# Patient Record
Sex: FEMALE | Race: WHITE | Employment: STUDENT
[De-identification: names, ages, dates, MRNs, and addresses within clinical notes are randomized per-mention and may not be internally consistent; named-entity substitution may affect disease eponyms.]

---
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
## 2020-03-24 ENCOUNTER — ALLIED HEALTH/NURSE VISIT (OUTPATIENT)
Dept: FAMILY MEDICINE | Facility: OTHER | Age: 20
End: 2020-03-24
Payer: COMMERCIAL

## 2020-03-24 VITALS — DIASTOLIC BLOOD PRESSURE: 78 MMHG | BODY MASS INDEX: 30.53 KG/M2 | WEIGHT: 192 LBS | SYSTOLIC BLOOD PRESSURE: 138 MMHG

## 2020-03-24 DIAGNOSIS — Z30.42 ENCOUNTER FOR DEPO-PROVERA CONTRACEPTION: Primary | ICD-10-CM

## 2020-03-24 PROCEDURE — 96372 THER/PROPH/DIAG INJ SC/IM: CPT

## 2020-03-24 PROCEDURE — 25000128 H RX IP 250 OP 636: Performed by: NURSE PRACTITIONER

## 2020-03-24 RX ADMIN — MEDROXYPROGESTERONE ACETATE 150 MG: 150 INJECTION, SUSPENSION INTRAMUSCULAR at 12:43

## 2020-03-24 NOTE — PROGRESS NOTES
Clinic Administered Medication Documentation    Depo Provera Documentation    URINE HCG: not indicated    Depo-Provera Standing Order inclusion/exclusion criteria reviewed.   Patient meets: inclusion criteria     BP: 138/78  LAST PAP/EXAM: No results found for: PAP    Prior to injection, verified patient identity using patient's name and date of birth. Medication was administered. Please see MAR and medication order for additional information.     Was entire vial of medication used? Yes  Vial/Syringe: Single dose vial  Expiration Date:  11/2021    NEXT INJECTION DUE: 6/9/20 - 6/23/20    Appointment reminder card provided to patient.       Maral MUÑIZN, RN on 3/24/2020 at 12:43 PM

## 2020-09-29 ENCOUNTER — ALLIED HEALTH/NURSE VISIT (OUTPATIENT)
Dept: FAMILY MEDICINE | Facility: OTHER | Age: 20
End: 2020-09-29
Payer: COMMERCIAL

## 2020-09-29 DIAGNOSIS — R50.9 FEVER, UNSPECIFIED: Primary | ICD-10-CM

## 2020-09-29 DIAGNOSIS — R53.83 FATIGUE, UNSPECIFIED TYPE: ICD-10-CM

## 2020-09-29 PROCEDURE — U0003 INFECTIOUS AGENT DETECTION BY NUCLEIC ACID (DNA OR RNA); SEVERE ACUTE RESPIRATORY SYNDROME CORONAVIRUS 2 (SARS-COV-2) (CORONAVIRUS DISEASE [COVID-19]), AMPLIFIED PROBE TECHNIQUE, MAKING USE OF HIGH THROUGHPUT TECHNOLOGIES AS DESCRIBED BY CMS-2020-01-R: HCPCS | Mod: ZL

## 2020-09-29 PROCEDURE — 99207 ZZC NO CHARGE NURSE ONLY: CPT

## 2020-09-29 PROCEDURE — C9803 HOPD COVID-19 SPEC COLLECT: HCPCS

## 2020-09-30 LAB
SARS-COV-2 RNA SPEC QL NAA+PROBE: NOT DETECTED
SPECIMEN SOURCE: NORMAL

## 2020-11-09 ENCOUNTER — ALLIED HEALTH/NURSE VISIT (OUTPATIENT)
Dept: FAMILY MEDICINE | Facility: OTHER | Age: 20
End: 2020-11-09
Attending: FAMILY MEDICINE
Payer: COMMERCIAL

## 2020-11-09 DIAGNOSIS — J02.9 SORE THROAT: Primary | ICD-10-CM

## 2020-11-09 PROCEDURE — C9803 HOPD COVID-19 SPEC COLLECT: HCPCS

## 2020-11-09 PROCEDURE — U0003 INFECTIOUS AGENT DETECTION BY NUCLEIC ACID (DNA OR RNA); SEVERE ACUTE RESPIRATORY SYNDROME CORONAVIRUS 2 (SARS-COV-2) (CORONAVIRUS DISEASE [COVID-19]), AMPLIFIED PROBE TECHNIQUE, MAKING USE OF HIGH THROUGHPUT TECHNOLOGIES AS DESCRIBED BY CMS-2020-01-R: HCPCS | Mod: ZL | Performed by: FAMILY MEDICINE

## 2020-11-09 PROCEDURE — 99207 PR NO CHARGE NURSE ONLY: CPT

## 2020-11-09 NOTE — PROGRESS NOTES
Patient swabbed for COVID-19 testing.  For sore throat, headache, stuffy nose and nausea.  Day Gruber LPN on 11/9/2020 at 2:19 PM

## 2020-11-11 LAB
SARS-COV-2 RNA SPEC QL NAA+PROBE: NOT DETECTED
SPECIMEN SOURCE: NORMAL

## 2020-12-27 ENCOUNTER — HEALTH MAINTENANCE LETTER (OUTPATIENT)
Age: 20
End: 2020-12-27

## 2021-03-06 ENCOUNTER — HEALTH MAINTENANCE LETTER (OUTPATIENT)
Age: 21
End: 2021-03-06

## 2021-03-29 ENCOUNTER — OFFICE VISIT (OUTPATIENT)
Dept: FAMILY MEDICINE | Facility: OTHER | Age: 21
End: 2021-03-29
Attending: PHYSICIAN ASSISTANT
Payer: COMMERCIAL

## 2021-03-29 ENCOUNTER — E-VISIT (OUTPATIENT)
Dept: URGENT CARE | Facility: URGENT CARE | Age: 21
End: 2021-03-29
Payer: COMMERCIAL

## 2021-03-29 VITALS
RESPIRATION RATE: 16 BRPM | WEIGHT: 199.6 LBS | DIASTOLIC BLOOD PRESSURE: 76 MMHG | TEMPERATURE: 97.8 F | SYSTOLIC BLOOD PRESSURE: 114 MMHG | HEIGHT: 67 IN | OXYGEN SATURATION: 99 % | BODY MASS INDEX: 31.33 KG/M2 | HEART RATE: 74 BPM

## 2021-03-29 DIAGNOSIS — N89.8 VAGINAL DISCHARGE: Primary | ICD-10-CM

## 2021-03-29 DIAGNOSIS — N76.0 BACTERIAL VAGINOSIS: Primary | ICD-10-CM

## 2021-03-29 DIAGNOSIS — R35.0 URINARY FREQUENCY: ICD-10-CM

## 2021-03-29 DIAGNOSIS — N89.8 VAGINAL DISCHARGE: ICD-10-CM

## 2021-03-29 DIAGNOSIS — B96.89 BACTERIAL VAGINOSIS: Primary | ICD-10-CM

## 2021-03-29 LAB
SPECIMEN SOURCE: ABNORMAL
WET PREP SPEC: ABNORMAL

## 2021-03-29 PROCEDURE — 99214 OFFICE O/P EST MOD 30 MIN: CPT | Performed by: PHYSICIAN ASSISTANT

## 2021-03-29 PROCEDURE — 87210 SMEAR WET MOUNT SALINE/INK: CPT | Mod: ZL | Performed by: PHYSICIAN ASSISTANT

## 2021-03-29 PROCEDURE — 99421 OL DIG E/M SVC 5-10 MIN: CPT | Performed by: PHYSICIAN ASSISTANT

## 2021-03-29 RX ORDER — METRONIDAZOLE 500 MG/1
500 TABLET ORAL 2 TIMES DAILY
Qty: 14 TABLET | Refills: 0 | Status: SHIPPED | OUTPATIENT
Start: 2021-03-29 | End: 2021-04-05

## 2021-03-29 ASSESSMENT — MIFFLIN-ST. JEOR: SCORE: 1700.07

## 2021-03-29 ASSESSMENT — PAIN SCALES - GENERAL: PAINLEVEL: NO PAIN (0)

## 2021-03-29 NOTE — PATIENT INSTRUCTIONS
Dear Diandra Browne,    We are sorry you are not feeling well. Based on the responses you provided, it is recommended that you be seen in-person in urgent care so we can better evaluate your symptoms. Please click here to find the nearest urgent care location to you.     You will need urine testing as well as a test on the vaginal discharge to determine what the underlying cause of your symptoms is.      Thank you for trusting us with your care.    Niurka Conklin PA-C

## 2021-03-29 NOTE — NURSING NOTE
Patient presents today for urinary issue. Patient complains of urinary urgency, discharge and a foul odor.    Medication Reconciliation Complete    Zaida Banks LPN  3/29/2021 1:42 PM

## 2021-03-29 NOTE — PROGRESS NOTES
Assessment & Plan     1. Vaginal discharge  Positive for BV. Declined STD testing.   - Wet Prep, Genital    2. Bacterial vaginosis  Wet prep positive for BV. Will treat with Flagyl as below. Avoid alcohol while on this medication. Follow up as needed.   - metroNIDAZOLE (FLAGYL) 500 MG tablet; Take 1 tablet (500 mg) by mouth 2 times daily for 7 days  Dispense: 14 tablet; Refill: 0      Return if symptoms worsen or fail to improve.    Birgit Sellers PA-C  [facility] AND \[facility] [place] [facility]\""   Diandra is a 20 year old who presents for the following health issues    HPI   Here for evaluation of vaginal concerns. Notes 4 days ago she developed an increase in vaginal discharge that is white and smells different compared to her baseline. Mild associated vaginal itching as well. Some urinary frequency. No STD or pregnancy concerns. No associated fever/chils, dysuria, hematuria, flank pain, abdominal pain, nausea, vomiting, diarrhea, constipation.    PAST MEDICAL HISTORY:   Past Medical History:   Diagnosis Date     Acute appendicitis 9/24/2016     Allergy status to unspecified drugs, medicaments and biological substances status     No Comments Provided     Mild intermittent asthma, uncomplicated     9/29/2009     Other ehrlichiosis     6/19/16     Personal history of other medical treatment (CODE)     No Comments Provided       PAST SURGICAL HISTORY:   Past Surgical History:   Procedure Laterality Date     APPENDECTOMY  2016       FAMILY HISTORY:   Family History   Problem Relation Age of Onset     Family History Negative Mother         Good Health     Family History Negative Father         Good Health     Asthma Father         Asthma     Allergy (Severe) Father         Allergies,Seasonal     Diabetes Maternal Grandfather         Diabetes     Hypertension Maternal Grandfather         Hypertension     Hyperlipidemia Maternal Grandfather         Hyperlipidemia     Cancer Paternal Grandmother          "Cancer,pancreatic, lung, liver     Other - See Comments Paternal Grandmother         migraine HA     Allergy (Severe) Paternal Grandmother         Allergies     Asthma Paternal Grandmother         Asthma       SOCIAL HISTORY:   Social History     Tobacco Use     Smoking status: Passive Smoke Exposure - Never Smoker     Smokeless tobacco: Never Used     Tobacco comment: parents both smoke   Substance Use Topics     Alcohol use: No     Alcohol/week: 0.0 standard drinks        Allergies   Allergen Reactions     Penicillins Hives     Amoxicillin Hives and Rash     Current Outpatient Medications   Medication     albuterol (PROVENTIL) (2.5 MG/3ML) 0.083% neb solution     albuterol (VENTOLIN HFA) 108 (90 Base) MCG/ACT inhaler     Spacer/Aero-Holding Chambers (SPACER/AERO-HOLD CHAMBER MASK) EFREM     No current facility-administered medications for this visit.          Review of Systems   Per HPI.         Objective    /76   Pulse 74   Temp 97.8  F (36.6  C)   Resp 16   Ht 1.689 m (5' 6.5\")   Wt 90.5 kg (199 lb 9.6 oz)   LMP 03/16/2021 (Approximate)   SpO2 99%   BMI 31.73 kg/m    Body mass index is 31.73 kg/m .  Physical Exam   General: Pleasant, in no apparent distress.  Genitourinary Exam Female: External genitalia, vulva and vagina are without lesions, masses, or ulcerations. Mild amount of white discharge. Wet prep obtained.   Psych: Appropriate mood and affect.            "

## 2021-06-03 DIAGNOSIS — J45.20 MILD INTERMITTENT ASTHMA WITHOUT COMPLICATION: ICD-10-CM

## 2021-06-04 RX ORDER — ALBUTEROL SULFATE 90 UG/1
AEROSOL, METERED RESPIRATORY (INHALATION)
Qty: 18 G | Refills: 1 | Status: SHIPPED | OUTPATIENT
Start: 2021-06-04 | End: 2022-08-19

## 2021-06-04 NOTE — TELEPHONE ENCOUNTER
" [facility]acy [id number] sent Rx request for the following:      Requested Prescriptions   Pending Prescriptions Disp Refills     albuterol (VENTOLIN HFA) 108 (90 Base) MCG/ACT inhaler 18 g 0     Sig: INHALE 2 PUFFS INTO THE LUNGS EVERY 4 HOURS AS NEEDED       Asthma Maintenance Inhalers - Anticholinergics Failed - 6/3/2021  8:00 AM        Failed - Asthma control assessment score within normal limits in last 6 months     Please review ACT score.           Failed - Recent (6 mo) or future (30 days) visit within the authorizing provider's specialty     Patient had office visit in the last 6 months or has a visit in the next 30 days with authorizing provider or within the authorizing provider's specialty.  See \"Patient Info\" tab in inbasket, or \"Choose Columns\" in Meds & Orders section of the refill encounter.           Short-Acting Beta Agonist Inhalers Protocol  Failed - 6/3/2021  8:00 AM        Failed - Asthma control assessment score within normal limits in last 6 months     Please review ACT score.           Failed - Recent (6 mo) or future (30 days) visit within the authorizing provider's specialty     Patient had office visit in the last 6 months or has a visit in the next 30 days with authorizing provider or within the authorizing provider's specialty.  See \"Patient Info\" tab in inbasket, or \"Choose Columns\" in Meds & Orders section of the refill encounter.              Last Prescription Date:    12/30/19   Last Fill Qty/Refills:        1 inhaler , R-3  Last Office Visit:              3/29/21  Future Office visit:          None   Routing refill request to provider for review/approval because:    Does not meet RN refill criteria. Will route to last provider she has seen for review and consideration. Danni Gillis RN on 6/4/2021 at 1:00 PM    "

## 2022-08-19 DIAGNOSIS — J45.20 MILD INTERMITTENT ASTHMA WITHOUT COMPLICATION: ICD-10-CM

## 2022-08-19 RX ORDER — ALBUTEROL SULFATE 90 UG/1
AEROSOL, METERED RESPIRATORY (INHALATION)
Qty: 54 G | Refills: 0 | Status: SHIPPED | OUTPATIENT
Start: 2022-08-19

## 2022-08-19 NOTE — TELEPHONE ENCOUNTER
[id number] sent Rx request for the following:      Requested Prescriptions   Pending Prescriptions Disp Refills     albuterol (VENTOLIN HFA) 108 (90 Base) MCG/ACT inhaler [Pharmacy Med Name: VENTOLIN HFA 90MCG/ACT INHALER]  1     Sig: INHALE 2 PUFFS INTO THE LUNGS EVERY 4 HOURS AS NEEDED       Asthma Maintenance Inhalers - Anticholinergics Failed - 8/19/2022  3:17 PM    Short-Acting Beta Agonist Inhalers Protocol  Failed - 8/19/2022  3:17 PM        Failed - Asthma control assessment score within normal limits in last 6 months     Please review ACT score.         Failed - Recent (6 mo) or future (30 days) visit within the authorizing provider's specialty     Last Prescription Date:   6/4/21  Last Fill Qty/Refills:         18 g, R-1    Last Office Visit:              3/29/21   Future Office visit:           None    In clinical absence of Birgit Sellers patient is requesting that this message be sent to the covering provider for consideration please.    Sarai Tamayo RN .............. 8/19/2022  3:18 PM

## 2022-08-30 ENCOUNTER — HOSPITAL ENCOUNTER (EMERGENCY)
Facility: HOSPITAL | Age: 22
Discharge: HOME | End: 2022-08-30
Attending: PHYSICIAN ASSISTANT | Admitting: PHYSICIAN ASSISTANT
Payer: COMMERCIAL

## 2022-08-30 VITALS
DIASTOLIC BLOOD PRESSURE: 83 MMHG | TEMPERATURE: 96.7 F | SYSTOLIC BLOOD PRESSURE: 135 MMHG | RESPIRATION RATE: 16 BRPM | OXYGEN SATURATION: 96 % | HEART RATE: 84 BPM

## 2022-08-30 DIAGNOSIS — K08.89 PAIN, DENTAL: ICD-10-CM

## 2022-08-30 PROCEDURE — G0463 HOSPITAL OUTPT CLINIC VISIT: HCPCS

## 2022-08-30 PROCEDURE — 99213 OFFICE O/P EST LOW 20 MIN: CPT | Performed by: PHYSICIAN ASSISTANT

## 2022-08-30 RX ORDER — CLINDAMYCIN HCL 300 MG
300 CAPSULE ORAL 3 TIMES DAILY
Qty: 21 CAPSULE | Refills: 0 | Status: SHIPPED | OUTPATIENT
Start: 2022-08-30 | End: 2022-09-06

## 2022-08-30 ASSESSMENT — ENCOUNTER SYMPTOMS: FEVER: 0

## 2022-08-31 NOTE — ED TRIAGE NOTES
Pt presents with tooth pain left lower side. Pt rates current pain 8/10. Denies fever. Pt does not have a dentist.

## 2022-08-31 NOTE — ED PROVIDER NOTES
History     Chief Complaint   Patient presents with     Dental Pain     HPI  Diandra Browne is a 21 year old female who presents to urgent care for evaluation of dental pain.  Patient states that she has had a lower left molar that has a crack out of it for approximately 4 years and over past couple days for no apparent reason it has become painful around it.  She denies any fevers, chills, bad taste in her mouth, or any other associated symptoms.  Patient states that she tried to contact her local dentist today but was unable to get an appointment.  Patient states she has been performing warm salt water rinses.    Allergies:  Allergies   Allergen Reactions     Penicillins Hives     Amoxicillin Hives and Rash       Problem List:    Patient Active Problem List    Diagnosis Date Noted     Mild intermittent asthma 09/29/2009     Priority: Medium        Past Medical History:    Past Medical History:   Diagnosis Date     Acute appendicitis 9/24/2016     Allergy status to unspecified drugs, medicaments and biological substances status      Mild intermittent asthma, uncomplicated      Other ehrlichiosis      Personal history of other medical treatment (CODE)        Past Surgical History:    Past Surgical History:   Procedure Laterality Date     APPENDECTOMY  2016       Family History:    Family History   Problem Relation Age of Onset     Family History Negative Mother         Good Health     Family History Negative Father         Good Health     Asthma Father         Asthma     Allergy (Severe) Father         Allergies,Seasonal     Diabetes Maternal Grandfather         Diabetes     Hypertension Maternal Grandfather         Hypertension     Hyperlipidemia Maternal Grandfather         Hyperlipidemia     Cancer Paternal Grandmother         Cancer,pancreatic, lung, liver     Other - See Comments Paternal Grandmother         migraine HA     Allergy (Severe) Paternal Grandmother         Allergies     Asthma Paternal Grandmother          Asthma       Social History:  Marital Status:  Single [1]  Social History     Tobacco Use     Smoking status: Passive Smoke Exposure - Never Smoker     Smokeless tobacco: Never Used     Tobacco comment: parents both smoke   Substance Use Topics     Alcohol use: No     Alcohol/week: 0.0 standard drinks     Drug use: No        Medications:    clindamycin (CLEOCIN) 300 MG capsule  albuterol (PROVENTIL) (2.5 MG/3ML) 0.083% neb solution  albuterol (VENTOLIN HFA) 108 (90 Base) MCG/ACT inhaler  Spacer/Aero-Holding Chambers (SPACER/AERO-HOLD CHAMBER MASK) EFREM          Review of Systems   Constitutional: Negative for fever.   HENT: Positive for dental problem.    All other systems reviewed and are negative.      Physical Exam   BP: 135/83  Pulse: 84  Temp: (!) 96.7  F (35.9  C)  Resp: 16  SpO2: 96 %      Physical Exam  Vitals and nursing note reviewed.   Constitutional:       General: She is not in acute distress.     Appearance: Normal appearance. She is not ill-appearing.   HENT:      Mouth/Throat:        Comments: Tooth #18 has a crack over the medial anterior portion of the tooth.  No obvious dental abscess.  Cardiovascular:      Rate and Rhythm: Regular rhythm.      Heart sounds: Normal heart sounds.   Pulmonary:      Effort: Pulmonary effort is normal.      Breath sounds: Normal breath sounds.   Neurological:      Mental Status: She is oriented to person, place, and time.         ED Course                 Procedures             Critical Care time:               No results found for this or any previous visit (from the past 24 hour(s)).    Medications - No data to display    Assessments & Plan (with Medical Decision Making)   #1.  Dental pain    Discussed exam findings with patient.  Patient is prescribed course of clindamycin due to penicillin allergy.  She was given a list of local dental resources and should follow-up when available.  Tylenol or ibuprofen as directed for pain.  She is instructed on multiple  supportive cares for her dental pain as well.  Any additional concerns she can return to urgent care or follow-up with primary care provider.  Patient verbalized understanding and agreement of plan.    I have reviewed the nursing notes.    I have reviewed the findings, diagnosis, plan and need for follow up with the patient.    New Prescriptions    CLINDAMYCIN (CLEOCIN) 300 MG CAPSULE    Take 1 capsule (300 mg) by mouth 3 times daily for 7 days       Final diagnoses:   Pain, dental       8/30/2022   [facility]     Matthew Mcguire PA-C  08/30/22 [id number]

## 2022-08-31 NOTE — ED TRIAGE NOTES
"Pt presents with tooth pain onset a \"few days\"  Pt states she tried to get into a dentist today, but was unable.       "

## 2022-11-20 ENCOUNTER — HOSPITAL ENCOUNTER (EMERGENCY)
Facility: OTHER | Age: 22
Discharge: HOME | End: 2022-11-20
Attending: EMERGENCY MEDICINE | Admitting: EMERGENCY MEDICINE
Payer: COMMERCIAL

## 2022-11-20 ENCOUNTER — APPOINTMENT (OUTPATIENT)
Dept: GENERAL RADIOLOGY | Facility: OTHER | Age: 22
End: 2022-11-20
Attending: EMERGENCY MEDICINE
Payer: COMMERCIAL

## 2022-11-20 VITALS
DIASTOLIC BLOOD PRESSURE: 78 MMHG | BODY MASS INDEX: 31.64 KG/M2 | TEMPERATURE: 98.2 F | RESPIRATION RATE: 18 BRPM | SYSTOLIC BLOOD PRESSURE: 136 MMHG | HEART RATE: 71 BPM | OXYGEN SATURATION: 97 % | WEIGHT: 199 LBS

## 2022-11-20 DIAGNOSIS — S63.697A OTHER SPRAIN OF LEFT LITTLE FINGER, INITIAL ENCOUNTER: ICD-10-CM

## 2022-11-20 PROCEDURE — 73140 X-RAY EXAM OF FINGER(S): CPT | Mod: LT

## 2022-11-20 PROCEDURE — 99282 EMERGENCY DEPT VISIT SF MDM: CPT | Performed by: EMERGENCY MEDICINE

## 2022-11-20 PROCEDURE — 99283 EMERGENCY DEPT VISIT LOW MDM: CPT | Performed by: EMERGENCY MEDICINE

## 2022-11-20 ASSESSMENT — ENCOUNTER SYMPTOMS
NAUSEA: 0
AGITATION: 0
ARTHRALGIAS: 1
CHILLS: 0
SHORTNESS OF BREATH: 0
FEVER: 0
VOMITING: 0
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
## 2023-05-30 ENCOUNTER — HOSPITAL ENCOUNTER (EMERGENCY)
Facility: OTHER | Age: 23
Discharge: HOME | End: 2023-05-30
Attending: STUDENT IN AN ORGANIZED HEALTH CARE EDUCATION/TRAINING PROGRAM | Admitting: STUDENT IN AN ORGANIZED HEALTH CARE EDUCATION/TRAINING PROGRAM
Payer: COMMERCIAL

## 2023-05-30 ENCOUNTER — APPOINTMENT (OUTPATIENT)
Dept: GENERAL RADIOLOGY | Facility: OTHER | Age: 23
End: 2023-05-30
Attending: STUDENT IN AN ORGANIZED HEALTH CARE EDUCATION/TRAINING PROGRAM
Payer: COMMERCIAL

## 2023-05-30 VITALS
DIASTOLIC BLOOD PRESSURE: 85 MMHG | SYSTOLIC BLOOD PRESSURE: 133 MMHG | HEART RATE: 94 BPM | TEMPERATURE: 98.3 F | HEIGHT: 66 IN | OXYGEN SATURATION: 98 % | WEIGHT: 199 LBS | BODY MASS INDEX: 31.98 KG/M2 | RESPIRATION RATE: 16 BRPM

## 2023-05-30 DIAGNOSIS — J45.901 MILD ASTHMA WITH EXACERBATION, UNSPECIFIED WHETHER PERSISTENT: ICD-10-CM

## 2023-05-30 PROCEDURE — 999N000157 HC STATISTIC RCP TIME EA 10 MIN

## 2023-05-30 PROCEDURE — 71046 X-RAY EXAM CHEST 2 VIEWS: CPT

## 2023-05-30 PROCEDURE — 94640 AIRWAY INHALATION TREATMENT: CPT

## 2023-05-30 PROCEDURE — 250N000009 HC RX 250: Performed by: STUDENT IN AN ORGANIZED HEALTH CARE EDUCATION/TRAINING PROGRAM

## 2023-05-30 PROCEDURE — 99284 EMERGENCY DEPT VISIT MOD MDM: CPT | Performed by: STUDENT IN AN ORGANIZED HEALTH CARE EDUCATION/TRAINING PROGRAM

## 2023-05-30 PROCEDURE — 250N000012 HC RX MED GY IP 250 OP 636 PS 637: Performed by: STUDENT IN AN ORGANIZED HEALTH CARE EDUCATION/TRAINING PROGRAM

## 2023-05-30 PROCEDURE — 99283 EMERGENCY DEPT VISIT LOW MDM: CPT | Mod: 25 | Performed by: STUDENT IN AN ORGANIZED HEALTH CARE EDUCATION/TRAINING PROGRAM

## 2023-05-30 RX ORDER — PREDNISONE 20 MG/1
TABLET ORAL
Qty: 6 TABLET | Refills: 0 | Status: SHIPPED | OUTPATIENT
Start: 2023-05-30 | End: 2023-05-30

## 2023-05-30 RX ORDER — PREDNISONE 20 MG/1
40 TABLET ORAL ONCE
Status: COMPLETED | OUTPATIENT
Start: 2023-05-30 | End: 2023-05-30

## 2023-05-30 RX ORDER — ALBUTEROL SULFATE 0.83 MG/ML
2.5 SOLUTION RESPIRATORY (INHALATION) ONCE
Status: COMPLETED | OUTPATIENT
Start: 2023-05-30 | End: 2023-05-30

## 2023-05-30 RX ORDER — PREDNISONE 20 MG/1
TABLET ORAL
Qty: 4 TABLET | Refills: 0 | Status: SHIPPED | OUTPATIENT
Start: 2023-05-30

## 2023-05-30 RX ADMIN — PREDNISONE 40 MG: 20 TABLET ORAL at 20:18

## 2023-05-30 RX ADMIN — ALBUTEROL SULFATE 2.5 MG: 2.5 SOLUTION RESPIRATORY (INHALATION) at 19:42

## 2023-05-30 NOTE — ED TRIAGE NOTES
Pt here by herself with c/o chest tightness, SOB and cough, pt reports a hx of asthma and has been using her inhaler without relief, VSS, pt out into waiting room     Triage Assessment     Row Name 05/30/23 8577       Triage Assessment (Adult)    Airway WDL WDL       Respiratory WDL    Respiratory WDL WDL       Skin Circulation/Temperature WDL    Skin Circulation/Temperature WDL WDL       Cardiac WDL    Cardiac WDL WDL       Peripheral/Neurovascular WDL    Peripheral Neurovascular WDL WDL       Cognitive/Neuro/Behavioral WDL    Cognitive/Neuro/Behavioral WDL WDL

## 2023-05-31 NOTE — DISCHARGE INSTRUCTIONS
You were treated with albuterol nebulizer and improved.  Suspect this is asthma related.  Continue to use your albuterol inhaler as needed.  Complete 2-day course of steroids with first dose tomorrow. We gave you 1 dose of this here today. This will help with inflammation in your lungs.    Please review attached instructions including reasons to return to the emergency department.

## 2023-05-31 NOTE — ED PROVIDER NOTES
History     Chief Complaint   Patient presents with     Shortness of Breath       Diandra Browne is a 22 year old female who presents with dyspnea.  Gradual onset starting several days ago.  Endorses associated substernal chest tightness and dry cough.  Home albuterol inhaler not helpful.  History includes asthma.  Denies any allergies or recent new exposures to topical agents, animal dander or other new environmental exposures.  Denies fever, chills, productive cough.  No recent illness.  History does include asthma exacerbations, most recently about 1 month ago.    Allergies   Allergen Reactions     Penicillins Hives     Amoxicillin Hives and Rash       Patient Active Problem List    Diagnosis Date Noted     Mild intermittent asthma 09/29/2009     Priority: Medium       Past Medical History:   Diagnosis Date     Acute appendicitis 9/24/2016     Allergy status to unspecified drugs, medicaments and biological substances status      Mild intermittent asthma, uncomplicated      Other ehrlichiosis      Personal history of other medical treatment (CODE)        Past Surgical History:   Procedure Laterality Date     APPENDECTOMY  2016       Family History   Problem Relation Age of Onset     Family History Negative Mother         Good Health     Family History Negative Father         Good Health     Asthma Father         Asthma     Allergy (Severe) Father         Allergies,Seasonal     Diabetes Maternal Grandfather         Diabetes     Hypertension Maternal Grandfather         Hypertension     Hyperlipidemia Maternal Grandfather         Hyperlipidemia     Cancer Paternal Grandmother         Cancer,pancreatic, lung, liver     Other - See Comments Paternal Grandmother         migraine HA     Allergy (Severe) Paternal Grandmother         Allergies     Asthma Paternal Grandmother         Asthma       Social History     Tobacco Use     Smoking status: Passive Smoke Exposure - Never Smoker     Smokeless tobacco: Never      "Tobacco comments:     parents both smoke   Substance Use Topics     Alcohol use: No     Alcohol/week: 0.0 standard drinks of alcohol     Drug use: No       Medications:    albuterol (PROVENTIL) (2.5 MG/3ML) 0.083% neb solution  predniSONE (DELTASONE) 20 MG tablet  albuterol (VENTOLIN HFA) 108 (90 Base) MCG/ACT inhaler  Spacer/Aero-Holding Chambers (SPACER/AERO-HOLD CHAMBER MASK) EFREM        Review of Systems: See HPI for pertinent negatives and positives. All other systems reviewed and found to be negative.    Physical Exam   /85   Pulse 94   Temp 98.3  F (36.8  C) (Tympanic)   Resp 16   Ht 1.676 m (5' 6\")   Wt 90.3 kg (199 lb)   SpO2 98%   BMI 32.12 kg/m       General: awake, comfortable  HEENT: atraumatic  Respiratory: normal effort, clear to auscultation bilaterally, wheeze with forced expiration  Cardiovascular: regular rate and rhythm, no murmurs  Abdomen: soft, nondistended, nontender  Extremities: no deformities, edema, or tenderness  Skin: warm, dry, no rashes  Neuro: alert, no focal deficits  Psych: appropriate mood and affect    ED Course           Results for orders placed or performed during the hospital encounter of 05/30/23 (from the past 24 hour(s))   XR Chest 2 Views    Narrative    PROCEDURE:  XR CHEST 2 VIEWS    HISTORY:  chest tightness, cough, sob.     COMPARISON:  None.    FINDINGS:   The cardiac silhouette is normal in size. The pulmonary vasculature is  normal.  The lungs are clear. No pleural effusion or pneumothorax.      Impression    IMPRESSION:  No acute cardiopulmonary disease.      TERESA MANJARREZ MD         SYSTEM ID:  [id number]       Medications   albuterol (PROVENTIL) neb solution 2.5 mg (2.5 mg Nebulization $Given 5/30/23 1942)   predniSONE (DELTASONE) tablet 40 mg (40 mg Oral $Given 5/30/23 2018)       Assessments & Plan (with Medical Decision Making)     I have reviewed the nursing notes.    22 year old female evaluated for shortness of breath and dry cough for the " past several days.  History does include asthma.  Normal vitals on room air.  Exam with wheeze only present with forced expiration.  Treated with albuterol nebulizer with patient feeling much improved.  She has plenty of medication remaining in her albuterol inhaler.  Due to no improvement at home with her albuterol inhaler, and prescribing a short course of prednisone with first dose given here in ED.  Discharged home with attached instructions on diagnosis provided including ED return precautions.     I have reviewed the findings, diagnosis, plan, and need for any follow up with the patient.    Patient instructions:   You were treated with albuterol nebulizer and improved.  Suspect this is asthma related.  Continue to use your albuterol inhaler as needed.  Complete 2-day course of steroids with first dose tomorrow. We gave you 1 dose of this here today. This will help with inflammation in your lungs.    Please review attached instructions including reasons to return to the emergency department.     Discharge Medication List as of 5/30/2023  8:17 PM          Final diagnoses:   Mild asthma with exacerbation, unspecified whether persistent       5/30/2023   [facility] AND [facility]     Van June MD  05/30/23 [id number]

## 2023-07-29 ENCOUNTER — HEALTH MAINTENANCE LETTER (OUTPATIENT)
Age: 23
End: 2023-07-29
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
## 2024-09-21 ENCOUNTER — HEALTH MAINTENANCE LETTER (OUTPATIENT)
Age: 24
End: 2024-09-21

## 2025-05-19 ENCOUNTER — HOSPITAL ENCOUNTER (EMERGENCY)
Facility: OTHER | Age: 25
Discharge: HOME | End: 2025-05-19
Attending: EMERGENCY MEDICINE
Payer: COMMERCIAL

## 2025-05-19 VITALS
HEART RATE: 92 BPM | RESPIRATION RATE: 20 BRPM | DIASTOLIC BLOOD PRESSURE: 97 MMHG | OXYGEN SATURATION: 100 % | TEMPERATURE: 98.8 F | SYSTOLIC BLOOD PRESSURE: 134 MMHG

## 2025-05-19 DIAGNOSIS — R07.1 CHEST PAIN ON BREATHING: ICD-10-CM

## 2025-05-19 DIAGNOSIS — R06.4 HYPERVENTILATION: ICD-10-CM

## 2025-05-19 DIAGNOSIS — R06.02 SHORTNESS OF BREATH: ICD-10-CM

## 2025-05-19 PROCEDURE — 99283 EMERGENCY DEPT VISIT LOW MDM: CPT | Mod: 25 | Performed by: EMERGENCY MEDICINE

## 2025-05-19 PROCEDURE — 99284 EMERGENCY DEPT VISIT MOD MDM: CPT | Performed by: EMERGENCY MEDICINE

## 2025-05-19 PROCEDURE — 94640 AIRWAY INHALATION TREATMENT: CPT

## 2025-05-19 PROCEDURE — 250N000009 HC RX 250: Performed by: EMERGENCY MEDICINE

## 2025-05-19 PROCEDURE — 999N000157 HC STATISTIC RCP TIME EA 10 MIN

## 2025-05-19 PROCEDURE — 93010 ELECTROCARDIOGRAM REPORT: CPT | Performed by: INTERNAL MEDICINE

## 2025-05-19 RX ORDER — IPRATROPIUM BROMIDE AND ALBUTEROL SULFATE 2.5; .5 MG/3ML; MG/3ML
3 SOLUTION RESPIRATORY (INHALATION) ONCE
Status: COMPLETED | OUTPATIENT
Start: 2025-05-19 | End: 2025-05-19

## 2025-05-19 RX ADMIN — IPRATROPIUM BROMIDE AND ALBUTEROL SULFATE 3 ML: .5; 3 SOLUTION RESPIRATORY (INHALATION) at 23:19

## 2025-05-19 ASSESSMENT — ENCOUNTER SYMPTOMS
NEUROLOGICAL NEGATIVE: 1
SHORTNESS OF BREATH: 1
FEVER: 0
HEMATOLOGIC/LYMPHATIC NEGATIVE: 1
STRIDOR: 0
COUGH: 0
FATIGUE: 0
PSYCHIATRIC NEGATIVE: 1
CHILLS: 0
CHEST TIGHTNESS: 1
MUSCULOSKELETAL NEGATIVE: 1
WHEEZING: 0
PALPITATIONS: 0
CHOKING: 0
GASTROINTESTINAL NEGATIVE: 1

## 2025-05-19 ASSESSMENT — ACTIVITIES OF DAILY LIVING (ADL): ADLS_ACUITY_SCORE: 42

## 2025-05-20 LAB
ATRIAL RATE - MUSE: 80 BPM
DIASTOLIC BLOOD PRESSURE - MUSE: NORMAL MMHG
INTERPRETATION ECG - MUSE: NORMAL
P AXIS - MUSE: 55 DEGREES
PR INTERVAL - MUSE: 126 MS
QRS DURATION - MUSE: 84 MS
QT - MUSE: 370 MS
QTC - MUSE: 426 MS
R AXIS - MUSE: 59 DEGREES
SYSTOLIC BLOOD PRESSURE - MUSE: NORMAL MMHG
T AXIS - MUSE: 35 DEGREES
VENTRICULAR RATE- MUSE: 80 BPM

## 2025-05-20 NOTE — ED PROVIDER NOTES
History     Chief Complaint   Patient presents with    Chest Pain    Asthma Exacerbation     HPI  24 year old female with ho asthma.  She presents with CP and SOB that has been going on for 2-3 months.  Took her albuterol inhaler and felt it was not helping. Tonight was getting ready for work and started feeling SOB. No palpitations. Felt the pain as sharp wo radiation and arrived mild hyperventilating.   She did drive into ED on her own. No fevers. No recent sick contacts. No cough or fever.   Has used prednisone in the past. Not in over 6 months. No sweating. No ho CAD or PE. No tobacco or illicit drugs.   Patient seen in room #8.    Allergies:  Allergies   Allergen Reactions    Penicillins Hives    Amoxicillin Hives and Rash       Problem List:    Patient Active Problem List    Diagnosis Date Noted    Mild intermittent asthma 09/29/2009     Priority: Medium        Past Medical History:    Past Medical History:   Diagnosis Date    Acute appendicitis 9/24/2016    Allergy status to unspecified drugs, medicaments and biological substances status     Mild intermittent asthma, uncomplicated     Other ehrlichiosis     Personal history of other medical treatment (CODE)        Past Surgical History:    Past Surgical History:   Procedure Laterality Date    APPENDECTOMY  2016       Family History:    Family History   Problem Relation Age of Onset    Family History Negative Mother         Good Health    Family History Negative Father         Good Health    Asthma Father         Asthma    Allergy (Severe) Father         Allergies,Seasonal    Diabetes Maternal Grandfather         Diabetes    Hypertension Maternal Grandfather         Hypertension    Hyperlipidemia Maternal Grandfather         Hyperlipidemia    Cancer Paternal Grandmother         Cancer,pancreatic, lung, liver    Other - See Comments Paternal Grandmother         migraine HA    Allergy (Severe) Paternal Grandmother         Allergies    Asthma Paternal  Grandmother         Asthma       Social History:  Marital Status:  Single [1]  Social History     Tobacco Use    Smoking status: Passive Smoke Exposure - Never Smoker    Smokeless tobacco: Never    Tobacco comments:     parents both smoke   Substance Use Topics    Alcohol use: No     Alcohol/week: 0.0 standard drinks of alcohol    Drug use: No        Medications:    albuterol (PROVENTIL) (2.5 MG/3ML) 0.083% neb solution  albuterol (VENTOLIN HFA) 108 (90 Base) MCG/ACT inhaler  predniSONE (DELTASONE) 20 MG tablet  Spacer/Aero-Holding Chambers (SPACER/AERO-HOLD CHAMBER MASK) EFREM          Review of Systems   Constitutional:  Negative for chills, fatigue and fever.   HENT: Negative.  Negative for congestion.    Respiratory:  Positive for chest tightness and shortness of breath. Negative for cough, choking, wheezing and stridor.    Cardiovascular:  Positive for chest pain. Negative for palpitations and leg swelling.   Gastrointestinal: Negative.    Musculoskeletal: Negative.    Skin: Negative.    Neurological: Negative.    Hematological: Negative.    Psychiatric/Behavioral: Negative.         Physical Exam   BP: (!) 134/97  Pulse: 92  SpO2: 100 %      Physical Exam  Vitals and nursing note reviewed.   Constitutional:       General: She is not in acute distress.     Appearance: Normal appearance. She is well-developed. She is not ill-appearing or diaphoretic.      Comments: Mild hyperventilating, cooperative.    HENT:      Head: Atraumatic.      Mouth/Throat:      Mouth: Mucous membranes are moist.   Eyes:      General: No scleral icterus.     Conjunctiva/sclera: Conjunctivae normal.      Pupils: Pupils are equal, round, and reactive to light.   Cardiovascular:      Rate and Rhythm: Normal rate and regular rhythm.      Heart sounds: Normal heart sounds.   Pulmonary:      Effort: Pulmonary effort is normal. No tachypnea, accessory muscle usage or respiratory distress.      Breath sounds: Normal breath sounds. No stridor. No  wheezing or rales.   Abdominal:      General: Abdomen is flat.      Palpations: Abdomen is soft.   Musculoskeletal:         General: Normal range of motion.      Cervical back: Normal range of motion and neck supple.   Skin:     General: Skin is warm.      Findings: No rash.   Neurological:      Mental Status: She is alert and oriented to person, place, and time.   Psychiatric:         Mood and Affect: Mood is anxious.         ED Course     ED Course as of 05/19/25 2342   Mon May 19, 2025   2306 EKG independently reviewed by myself shows normal sinus rhythm at 80 without any ST or T wave changes     Procedures         No results found for this or any previous visit (from the past 24 hours).    Medications   ipratropium - albuterol 0.5 mg/2.5 mg/3 mL (DUONEB) neb solution 3 mL (3 mLs Nebulization $Given 5/19/25 4693)       Assessments & Plan (with Medical Decision Making)     I have reviewed the nursing notes.    I have reviewed the findings, diagnosis, plan and need for follow up with the patient.  25 yo female with ho asthma presents with 2 months of nondescript chest pain and SOB.   She did have similar presentation in 2021 and 2023 on chart review that appeared cw asthma exacerbation. She did take prednisone at that time.  Duoneb helped her breathing but did little for her chest pain. She feels there is something more going on than her asthma or any suggestion the it might be anxiety. Vitals are stable, sats were 99% RA. She was not tachycardic.  Likelihood of PE, CAD etiology is less likely. GERD and other GI related issues is less likely as well. However, when offered further assessment including labs, CXR, she felt we were not listening to her and taking her symptoms seriously. She then choose to leave. She was encouraged to return at any time her symptoms were worsening. We did discuss possible trial of Toradol and ativan, but she did not have a ride home. She remained quite tearful and upset prior to  discharge. She did appear to understand discharge instructions and return precautions.    New Prescriptions    No medications on file       Final diagnoses:   Shortness of breath   Chest pain on breathing   Hyperventilation       5/19/2025   [facility] DO  05/20/25 1824

## 2025-05-20 NOTE — ED TRIAGE NOTES
Pt here for chest pain x2 months and an asthma exacerbation.  Pt states she took her rescue inhaler at approx. 2200 and this did not help.  Pain in pts' chest is midsternal over to the left and radiates to her shoulder blades.  Pt has not been seen for this in the past 2 months.

## 2025-05-20 NOTE — DISCHARGE INSTRUCTIONS
Drink plenty of fluids.  Continue with your albuterol as needed.  Consider ibuprofen 400-600mg every 6-8 hours as needed.  Please follow up for further testing when you feel comfortable.

## (undated) RX ORDER — MEDROXYPROGESTERONE ACETATE 150 MG/ML
INJECTION, SUSPENSION INTRAMUSCULAR
Status: DISPENSED
Start: 2018-06-06

## (undated) RX ORDER — MEDROXYPROGESTERONE ACETATE 150 MG/ML
INJECTION, SUSPENSION INTRAMUSCULAR
Status: DISPENSED
Start: 2018-03-19

## (undated) RX ORDER — MEDROXYPROGESTERONE ACETATE 150 MG/ML
INJECTION, SUSPENSION INTRAMUSCULAR
Status: DISPENSED
Start: 2019-07-01

## (undated) RX ORDER — MEDROXYPROGESTERONE ACETATE 150 MG/ML
INJECTION, SUSPENSION INTRAMUSCULAR
Status: DISPENSED
Start: 2019-12-30

## (undated) RX ORDER — MEDROXYPROGESTERONE ACETATE 150 MG/ML
INJECTION, SUSPENSION INTRAMUSCULAR
Status: DISPENSED
Start: 2018-10-09

## (undated) RX ORDER — MEDROXYPROGESTERONE ACETATE 150 MG/ML
INJECTION, SUSPENSION INTRAMUSCULAR
Status: DISPENSED
Start: 2019-01-07

## (undated) RX ORDER — MEDROXYPROGESTERONE ACETATE 150 MG/ML
INJECTION, SUSPENSION INTRAMUSCULAR
Status: DISPENSED
Start: 2019-04-05

## (undated) RX ORDER — IPRATROPIUM BROMIDE AND ALBUTEROL SULFATE 2.5; .5 MG/3ML; MG/3ML
SOLUTION RESPIRATORY (INHALATION)
Status: DISPENSED
Start: 2025-05-19

## (undated) RX ORDER — MEDROXYPROGESTERONE ACETATE 150 MG/ML
INJECTION, SUSPENSION INTRAMUSCULAR
Status: DISPENSED
Start: 2020-03-24

## (undated) RX ORDER — MEDROXYPROGESTERONE ACETATE 150 MG/ML
INJECTION, SUSPENSION INTRAMUSCULAR
Status: DISPENSED
Start: 2019-09-27

## (undated) RX ORDER — PREDNISONE 20 MG/1
TABLET ORAL
Status: DISPENSED
Start: 2023-05-30

## (undated) RX ORDER — ALBUTEROL SULFATE 0.83 MG/ML
SOLUTION RESPIRATORY (INHALATION)
Status: DISPENSED
Start: 2023-05-30